# Patient Record
Sex: MALE | Race: WHITE | NOT HISPANIC OR LATINO | Employment: FULL TIME | URBAN - METROPOLITAN AREA
[De-identification: names, ages, dates, MRNs, and addresses within clinical notes are randomized per-mention and may not be internally consistent; named-entity substitution may affect disease eponyms.]

---

## 2023-12-18 ENCOUNTER — LAB (OUTPATIENT)
Dept: LAB | Facility: LAB | Age: 38
End: 2023-12-18
Payer: COMMERCIAL

## 2023-12-18 ENCOUNTER — OFFICE VISIT (OUTPATIENT)
Dept: PRIMARY CARE | Facility: HOSPITAL | Age: 38
End: 2023-12-18
Payer: COMMERCIAL

## 2023-12-18 VITALS
OXYGEN SATURATION: 95 % | SYSTOLIC BLOOD PRESSURE: 126 MMHG | HEIGHT: 72 IN | BODY MASS INDEX: 33.18 KG/M2 | DIASTOLIC BLOOD PRESSURE: 70 MMHG | TEMPERATURE: 98.8 F | WEIGHT: 245 LBS

## 2023-12-18 DIAGNOSIS — Z00.00 ROUTINE HEALTH MAINTENANCE: ICD-10-CM

## 2023-12-18 DIAGNOSIS — E78.5 HYPERLIPIDEMIA, UNSPECIFIED HYPERLIPIDEMIA TYPE: ICD-10-CM

## 2023-12-18 DIAGNOSIS — Z01.00 ENCOUNTER FOR VISION SCREENING: ICD-10-CM

## 2023-12-18 DIAGNOSIS — R03.0 ELEVATED BLOOD PRESSURE READING: ICD-10-CM

## 2023-12-18 DIAGNOSIS — Z00.00 ROUTINE ADULT HEALTH MAINTENANCE: ICD-10-CM

## 2023-12-18 DIAGNOSIS — E78.00 ELEVATED LDL CHOLESTEROL LEVEL: Primary | ICD-10-CM

## 2023-12-18 DIAGNOSIS — Z00.00 ROUTINE ADULT HEALTH MAINTENANCE: Primary | ICD-10-CM

## 2023-12-18 PROBLEM — F17.200 SMOKING: Status: ACTIVE | Noted: 2023-12-18

## 2023-12-18 LAB
ALBUMIN SERPL BCP-MCNC: 4.8 G/DL (ref 3.4–5)
ALP SERPL-CCNC: 73 U/L (ref 33–120)
ALT SERPL W P-5'-P-CCNC: 19 U/L (ref 10–52)
ANION GAP SERPL CALC-SCNC: 14 MMOL/L (ref 10–20)
AST SERPL W P-5'-P-CCNC: 16 U/L (ref 9–39)
BILIRUB SERPL-MCNC: 0.7 MG/DL (ref 0–1.2)
BUN SERPL-MCNC: 16 MG/DL (ref 6–23)
CALCIUM SERPL-MCNC: 9.7 MG/DL (ref 8.6–10.3)
CHLORIDE SERPL-SCNC: 100 MMOL/L (ref 98–107)
CHOLEST SERPL-MCNC: 201 MG/DL (ref 0–199)
CHOLESTEROL/HDL RATIO: 4.3
CO2 SERPL-SCNC: 27 MMOL/L (ref 21–32)
CREAT SERPL-MCNC: 0.83 MG/DL (ref 0.5–1.3)
GFR SERPL CREATININE-BSD FRML MDRD: >90 ML/MIN/1.73M*2
GLUCOSE SERPL-MCNC: 86 MG/DL (ref 74–99)
HDLC SERPL-MCNC: 46.5 MG/DL
LDLC SERPL CALC-MCNC: 130 MG/DL
NON HDL CHOLESTEROL: 155 MG/DL (ref 0–149)
POTASSIUM SERPL-SCNC: 4.2 MMOL/L (ref 3.5–5.3)
PROT SERPL-MCNC: 7.3 G/DL (ref 6.4–8.2)
SODIUM SERPL-SCNC: 137 MMOL/L (ref 136–145)
TRIGL SERPL-MCNC: 123 MG/DL (ref 0–149)
VLDL: 25 MG/DL (ref 0–40)

## 2023-12-18 PROCEDURE — 80053 COMPREHEN METABOLIC PANEL: CPT

## 2023-12-18 PROCEDURE — 36415 COLL VENOUS BLD VENIPUNCTURE: CPT

## 2023-12-18 PROCEDURE — 99396 PREV VISIT EST AGE 40-64: CPT | Performed by: INTERNAL MEDICINE

## 2023-12-18 PROCEDURE — 80061 LIPID PANEL: CPT

## 2023-12-18 PROCEDURE — 99395 PREV VISIT EST AGE 18-39: CPT | Performed by: INTERNAL MEDICINE

## 2023-12-18 PROCEDURE — 90471 IMMUNIZATION ADMIN: CPT | Performed by: INTERNAL MEDICINE

## 2023-12-18 ASSESSMENT — ENCOUNTER SYMPTOMS
OCCASIONAL FEELINGS OF UNSTEADINESS: 0
DEPRESSION: 0
LOSS OF SENSATION IN FEET: 0

## 2023-12-18 ASSESSMENT — PATIENT HEALTH QUESTIONNAIRE - PHQ9
1. LITTLE INTEREST OR PLEASURE IN DOING THINGS: NOT AT ALL
SUM OF ALL RESPONSES TO PHQ9 QUESTIONS 1 AND 2: 0
2. FEELING DOWN, DEPRESSED OR HOPELESS: NOT AT ALL

## 2023-12-18 NOTE — ASSESSMENT & PLAN NOTE
Fasting lipid profile due now.  Fasting blood sugar will be included.  We discussed strategies for lowering cholesterol including exercising for 30 minutes 5 days/week and eating a plant-based whole food diet.  We discussed objectives for diet including increasing intake of fruits and vegetables, increasing fiber, decreasing red meat, processed meat, saturated fat and glucose.  Santi was directed to online resources to help with meal planning.

## 2023-12-18 NOTE — ASSESSMENT & PLAN NOTE
We discussed the health risks of smoking, the health benefits of smoking cessation and different strategies to help quit.  Santi is ready to quit which is important for step.  We discussed prescription medications like Chantix and bupropion and also over-the-counter nicotine replacement.  He is going to read about these different options and make a decision on treatment.

## 2023-12-18 NOTE — PATIENT INSTRUCTIONS
Chantix, Nicotine replacement, Yasmine Acevedo - Craving to Quit - hiren    Flu shot today    Fasting blood work     Schedule eye exam     5 servings of fruits and veg per day, 35 grams of fiber   Avoid - red meat, processed meat, saturated fat, sugar     Movies:  The Game Changers  What the Health  [place] [employer]    Web Sites/Recipes:  Blue Zones  [place] Over [employer]  Plant waygum   Nutritionfacts.org     Authors:  Dr. Rip Horan     Cookbooks:  The Get Healthy, Go Vegan Cookbook: 125 Easy and Delicious Recipes to Jump-Start Weight Loss and Help you Feel Great - Dr. Rip Valadez's Cookbook for Reversing Diabetes:  150 Recipes Scientifically Proven to Reverse Diabetes Without Drugs - Dr. Rip Valadez  The Prevent and Reverse Heart Disease Cookbook - Dr. Adarsh Harper   The [place] Study All-Star Collection:  Whole Food, Plant-Based Recipes from Your Favorite Vegan  - Dr. LORE Alexandra  How Not To Die - Dr. Rob Horan  Blue Zones

## 2023-12-18 NOTE — ASSESSMENT & PLAN NOTE
BP within an acceptable range.  Fasting blood sugar and fasting lipid profile ordered.  Advised on testicular self-exam.  Prostate cancer screening will begin in the mid 40s.  Colon cancer screening will begin at 45.  Eye exam encouraged and referral for ophthalmology placed.  Flu vaccine given.  Tdap up-to-date.  Advised on smoking cessation.

## 2023-12-18 NOTE — PROGRESS NOTES
Chief Complaint   Patient presents with    [facility]         History Of Present Illness:    Santi Arthur is a 38 y.o. male presenting to [facility] care, update health maintenance and discuss strategies for smoking cessation.  Santi has not seen a primary care doctor for the past few years.  He is aware that he has to quit smoking and he is concerned about past high cholesterol levels.  He is aware that his diet can be better.  He has a physically active job but does not exercise outside of work.  His energy level is okay and he denies chest pain or shortness of breath with exertion.  He snores at night but has never had observed apneic episodes.  He denies any change in bowel or bladder habits.  He is currently smoking a pack of cigarettes per day.  He is smoked for over 20 years.  He states that he likes to smoke.  And is relaxing.  He understands that it is a health risk and still wants to quit.      Active Medical Problems:  Patient Active Problem List    Diagnosis Date Noted    Hyperlipidemia 12/18/2023    Smoking 12/18/2023    Routine health maintenance 12/18/2023    Elevated blood pressure reading 12/18/2023       Past Medical History:  Past Medical History:   Diagnosis Date    Hyperlipidemia     Smoking        Past Surgical History:  Past Surgical History:   Procedure Laterality Date    NO PAST SURGERIES           Social History:  Social History     Tobacco Use    Smoking status: Every Day     Packs/day: 1.00     Years: 20.00     Additional pack years: 0.00     Total pack years: 20.00     Types: Cigarettes    Smokeless tobacco: Never   Substance Use Topics    Alcohol use: Yes     Alcohol/week: 7.0 - 10.0 standard drinks of alcohol     Types: 7 - 10 Cans of beer per week     Comment: social 7-8 per week, mostly beer occ bourbon    Drug use: Never         Family History:  Family History   Problem Relation Name Age of Onset    Hypertension Mother      Pancreatic cancer Father      No Known Problems Brother       Eczema Daughter      No Known Problems Daughter          Allergies:  Patient has no known allergies.    Outpatient Medications:  No current outpatient medications on file.    Review of Systems:  Pertinent positives in review of systems outlined above.  Complete ROS otherwise negative.        Last Recorded Vitals:  Vitals:    12/18/23 1107   BP: 126/70   Temp: 37.1 °C (98.8 °F)   SpO2: 95%   Weight: 111 kg (245 lb)   Height: 1.829 m (6')   Body mass index is 33.23 kg/m².        Physical Exam  Vitals reviewed.   Constitutional:       Appearance: Normal appearance.   HENT:      Mouth/Throat:      Mouth: Mucous membranes are moist.      Pharynx: Oropharynx is clear.   Eyes:      Extraocular Movements: Extraocular movements intact.      Conjunctiva/sclera: Conjunctivae normal.      Pupils: Pupils are equal, round, and reactive to light.   Neck:      Vascular: No carotid bruit.   Cardiovascular:      Rate and Rhythm: Normal rate and regular rhythm.   Pulmonary:      Effort: Pulmonary effort is normal.      Breath sounds: Normal breath sounds.   Musculoskeletal:      Cervical back: No tenderness.      Right lower leg: No edema.      Left lower leg: No edema.   Lymphadenopathy:      Cervical: No cervical adenopathy.   Neurological:      General: No focal deficit present.      Mental Status: He is alert and oriented to person, place, and time.   Psychiatric:         Mood and Affect: Mood normal.          Assessment/Plan   Problem List Items Addressed This Visit       Hyperlipidemia     Fasting lipid profile due now.  Fasting blood sugar will be included.  We discussed strategies for lowering cholesterol including exercising for 30 minutes 5 days/week and eating a plant-based whole food diet.  We discussed objectives for diet including increasing intake of fruits and vegetables, increasing fiber, decreasing red meat, processed meat, saturated fat and glucose.  Santi was directed to online resources to help with meal  planning.         Routine health maintenance - Primary     BP within an acceptable range.  Fasting blood sugar and fasting lipid profile ordered.  Advised on testicular self-exam.  Prostate cancer screening will begin in the mid 40s.  Colon cancer screening will begin at 45.  Eye exam encouraged and referral for ophthalmology placed.  Flu vaccine given.  Tdap up-to-date.  Advised on smoking cessation.         Elevated blood pressure reading     Initial blood pressure was elevated, but follow-up blood pressure in the office was normal.         Relevant Orders    Comprehensive Metabolic Panel    Lipid Panel             Nicholas Arredondo MD

## 2023-12-20 ENCOUNTER — TELEPHONE (OUTPATIENT)
Dept: PRIMARY CARE | Facility: CLINIC | Age: 38
End: 2023-12-20
Payer: COMMERCIAL

## 2023-12-20 NOTE — TELEPHONE ENCOUNTER
Left voicemail for patient to give madiha call for results.   Patient called  back for results. He said he will be ready to stop smoking after first year

## 2024-06-27 ENCOUNTER — OFFICE VISIT (OUTPATIENT)
Dept: ORTHOPEDIC SURGERY | Facility: HOSPITAL | Age: 39
End: 2024-06-27
Payer: COMMERCIAL

## 2024-06-27 ENCOUNTER — HOSPITAL ENCOUNTER (OUTPATIENT)
Dept: RADIOLOGY | Facility: HOSPITAL | Age: 39
Discharge: HOME | End: 2024-06-27
Payer: COMMERCIAL

## 2024-06-27 DIAGNOSIS — M25.512 LEFT SHOULDER PAIN, UNSPECIFIED CHRONICITY: ICD-10-CM

## 2024-06-27 DIAGNOSIS — M75.82 ROTATOR CUFF TENDINITIS, LEFT: ICD-10-CM

## 2024-06-27 DIAGNOSIS — S40.012A CONTUSION OF LEFT SHOULDER, INITIAL ENCOUNTER: Primary | ICD-10-CM

## 2024-06-27 PROCEDURE — 73030 X-RAY EXAM OF SHOULDER: CPT | Mod: LEFT SIDE | Performed by: RADIOLOGY

## 2024-06-27 PROCEDURE — 99203 OFFICE O/P NEW LOW 30 MIN: CPT

## 2024-06-27 PROCEDURE — 99213 OFFICE O/P EST LOW 20 MIN: CPT

## 2024-06-27 PROCEDURE — 73030 X-RAY EXAM OF SHOULDER: CPT | Mod: LT

## 2024-06-27 RX ORDER — MELOXICAM 15 MG/1
15 TABLET ORAL DAILY
Qty: 30 TABLET | Refills: 0 | Status: SHIPPED | OUTPATIENT
Start: 2024-06-27 | End: 2024-07-27

## 2024-06-27 ASSESSMENT — PAIN - FUNCTIONAL ASSESSMENT: PAIN_FUNCTIONAL_ASSESSMENT: 0-10

## 2024-06-27 ASSESSMENT — PAIN SCALES - GENERAL: PAINLEVEL_OUTOF10: 4

## 2024-06-27 NOTE — PROGRESS NOTES
[facility] CARE PHYSICIAN: Ronaldo Keys MD  REFERRING PROVIDER: No referring provider defined for this encounter.     CONSULT ORTHOPAEDIC: Shoulder Evaluation    ASSESSMENT & PLAN    Impression: 39 y.o. male with a left shoulder contusion and rotator cuff tendinitis    Plan:   I explained to the patient the nature of their diagnosis.  I reviewed their imaging studies with them.    Based on the history, physical exam and imaging studies above, the patient's presentation is consistent with the above diagnosis.  I had a long discussion with the patient regarding their presentation and the treatment options.  Overall patient's physical exam is normal without any evidence of instability or weakness.  His pain is difficult to reproduce on exam.  Patient's pain has improved however is reaggravated by his persistent heavy lifting at work.  We discussed remaining out of work versus returning to work with lifting restrictions to allow time to rest/heal however patient declined.  Advised patient to finish the Medrol Dosepak prescribed to him by  and take the methocarbamol as needed.  I also provided him with a prescription for meloxicam.  He will continue to ice, rest and avoid aggravating positions of the left shoulder.  Should his pain not improve with the above plan he will follow-up with Dr. Steward for further evaluation and discussion of treatment options.    At the end of the visit, all questions were answered in full. The patient is in agreement with the plan and recommendations. They will call the office with any questions/concerns.    Note dictated with Corhythm software. Completed without full typed error editing and sent to avoid delay.       SUBJECTIVE  CHIEF COMPLAINT:   Chief Complaint   Patient presents with    Left Shoulder - Pain        HPI: Santi Arthur is a pleasant 39 y.o. male who presents to the orthopedic walk-in clinic with left shoulder pain.  Pain began approximately  1 week ago when he was at a water park and slid down a water slide jamming his elbow into the bottom of the pool.  He presented to the urgent care and was given methocarbamol and a Medrol Dosepak.  He feels as though this did not significantly improve his symptoms.  However patient does report that today is the best he has felt since his injury.  He notes that he has not rested the shoulder as he works at [employer] which requires heavy lifting.  He is unable to take time off of work as this is his busy season and is unable to work with lifting restrictions.  Denies history of left shoulder pain or injuries.  He localizes the pain to the trapezius/top of shoulder.  He has tried Tylenol and IcyHot as well as the medications prescribed to him by urgent care.  He is here today for further evaluation.    They deny any associated neck pain.  No numbness, tingling, or paresthesias.    REVIEW OF SYSTEMS  Constitutional: See HPI for pain assessment, No significant recent weight gain or loss, no fever or chills  Cardiovascular: No chest pain, shortness of breath  Respiratory: No difficulty breathing, no cough  Gastrointestinal: No nausea, vomiting, diarrhea, constipation  Musculoskeletal: Noted in HPI, positive for pain, restricted motion, stiffness  Integumentary: No rashes, easy bruising or skin lesions  Neurological: No headache, no numbness or tingling in extremities  Psychiatric: No mood changes or memory changes. No social issues  Heme/Lymph: No excessive swelling, easy bruising or excessive bleeding  ENT: No hearing changes, no nosebleeds  Eyes: No vision changes    Past Medical History:   Diagnosis Date    Hyperlipidemia     Smoking         No Known Allergies     Past Surgical History:   Procedure Laterality Date    NO PAST SURGERIES          Family History   Problem Relation Name Age of Onset    Hypertension Mother      Pancreatic cancer Father      No Known Problems Brother      Eczema Daughter      No Known Problems  Daughter          Social History     Socioeconomic History    Marital status:      Spouse name: Not on file    Number of children: Not on file    Years of education: Not on file    Highest education level: Not on file   Occupational History    Occupation: [employer]   Tobacco Use    Smoking status: Every Day     Current packs/day: 1.00     Average packs/day: 1 pack/day for 20.0 years (20.0 ttl pk-yrs)     Types: Cigarettes    Smokeless tobacco: Never   Substance and Sexual Activity    Alcohol use: Yes     Alcohol/week: 7.0 - 10.0 standard drinks of alcohol     Types: 7 - 10 Cans of beer per week     Comment: social 7-8 per week, mostly beer occ bourbon    Drug use: Never    Sexual activity: Yes     Comment:  9yr, [employer]   Other Topics Concern    Not on file   Social History Narrative    Active at work, no exercise outside of work      Social Determinants of Health     Financial Resource Strain: Not on file   Food Insecurity: Not on file   Transportation Needs: Not on file   Physical Activity: Not on file   Stress: Not on file   Social Connections: Not on file   Intimate Partner Violence: Not on file   Housing Stability: Not on file        CURRENT MEDICATIONS:   No current outpatient medications on file.     No current facility-administered medications for this visit.        OBJECTIVE    PHYSICAL EXAM      12/18/2023    11:07 AM   Vitals   Systolic 126   Diastolic 70   Temp 37.1 °C (98.8 °F)   Height (in) 1.829 m (6')   Weight (lb) 245   BMI 33.23 kg/m2   BSA (m2) 2.37 m2   Visit Report Report      There is no height or weight on file to calculate BMI.    GENERAL: A/Ox3, NAD. Appears healthy, well nourished  PSYCHIATRIC: Mood stable, appropriate memory recall  EYES: EOM intact, no scleral icterus  CARDIOVASCULAR: Palpable peripheral pulses  LUNGS: Breathing non-labored on room air  SKIN: No open lesions, rashes, ulcerations    MUSCULOSKELETAL:  Laterality: Left shoulder Exam  - Negative Spurlings, full  painless neck ROM  - Skin intact  - No erythema or warmth  - No ecchymosis or soft tissue swelling  - Shoulder ROM: Forward flexion 150, ER 50, IR lower thoracic without difficulty  - Strength:      Jobes 5/5     ER 5/5     Belly press and bearhug 5/5  - Palpation: Mild TTP over trapezius  - Andressa and Ernst negative  - Speeds and O'Briens negative    NEUROVASCULAR:  - Neurovascular Status: sensation intact to light touch distally, upper extremity motor grossly intact  - Capillary refill brisk at extremities, Bilateral peripheral pulses 2+    Imaging: Multiple views of the affected left shoulder(s) demonstrate: No acute osseous abnormality, no fracture, no significant degenerative changes.   X-rays were personally reviewed and interpreted by me.  Radiology reports were reviewed by me as well, if readily available at the time.

## 2024-07-01 ENCOUNTER — OFFICE VISIT (OUTPATIENT)
Dept: PRIMARY CARE | Facility: HOSPITAL | Age: 39
End: 2024-07-01
Payer: COMMERCIAL

## 2024-07-01 VITALS
HEART RATE: 87 BPM | TEMPERATURE: 98.4 F | DIASTOLIC BLOOD PRESSURE: 88 MMHG | SYSTOLIC BLOOD PRESSURE: 158 MMHG | OXYGEN SATURATION: 99 % | WEIGHT: 237.5 LBS | BODY MASS INDEX: 32.21 KG/M2

## 2024-07-01 DIAGNOSIS — M25.512 ACUTE PAIN OF LEFT SHOULDER: Primary | ICD-10-CM

## 2024-07-01 PROCEDURE — 99213 OFFICE O/P EST LOW 20 MIN: CPT | Performed by: INTERNAL MEDICINE

## 2024-07-01 RX ORDER — IBUPROFEN 600 MG/1
600 TABLET ORAL 2 TIMES DAILY
Qty: 30 TABLET | Refills: 0 | Status: SHIPPED | OUTPATIENT
Start: 2024-07-01

## 2024-07-01 NOTE — PROGRESS NOTES
Chief Complaint:   Shoulder Pain (Left shoulder pain)     History Of Present Illness:    Santi Arthur is a 39 y.o. male with active medical problems outlined below who presents for evaluation and management of left shoulder pain.      Hit elbow at water park came down slide and jammed elbow and shoulder -next day had pain   Seen at urgent care - one week ago   Seen at walk-in ortho clinic - Xray unremarkable for fracture or dislocation.   Pain is slowly improving.  Worse with certain movements like propping arm up on window while driving.   Pain is non-radiating.  Normal strength in hand.  No numbness or tingling.          Patient Active Problem List   Diagnosis    Hyperlipidemia    Smoking    Routine health maintenance    Elevated blood pressure reading    Acute pain of left shoulder       Current Outpatient Medications:     ibuprofen 600 mg tablet, Take 1 tablet (600 mg) by mouth 2 times a day., Disp: 30 tablet, Rfl: 0    meloxicam (Mobic) 15 mg tablet, Take 1 tablet (15 mg) by mouth once daily. (Patient not taking: Reported on 7/1/2024), Disp: 30 tablet, Rfl: 0  Patient has no known allergies.    Social History     Tobacco Use    Smoking status: Every Day     Current packs/day: 1.00     Average packs/day: 1 pack/day for 20.0 years (20.0 ttl pk-yrs)     Types: Cigarettes    Smokeless tobacco: Never   Substance Use Topics    Alcohol use: Yes     Alcohol/week: 7.0 - 10.0 standard drinks of alcohol     Types: 7 - 10 Cans of beer per week     Comment: social 7-8 per week, mostly beer occ bourbon    Drug use: Never         All pertinent aspects of medical and surgical history were reviewed and updated in chart    Review of Systems   Pertinent positives in review of systems outlined above.  Complete ROS otherwise negative.        Last Recorded Vitals:  No data found.         Physical Exam  Musculoskeletal:      Comments: Normal range of motion in left shoulder.  Rotator cuff appears intact.  No evidence of impingement.   No pain at the AC joint or subacromial bursa.  Some tenderness to palpation over the medial scapula.             The 10-year ASCVD risk score (Yohana GANDHI, et al., 2019) is: 6.4%    Values used to calculate the score:      Age: 39 years      Sex: Male      Is Non- : No      Diabetic: No      Tobacco smoker: Yes      Systolic Blood Pressure: 158 mmHg      Is BP treated: No      HDL Cholesterol: 46.5 mg/dL      Total Cholesterol: 201 mg/dL      Assessment/Plan   Problem List Items Addressed This Visit       Acute pain of left shoulder - Primary     Scapulothoracic pain related to muscle strain.  X-ray of shoulder within normal limits, and rotator cuff appears intact by exam.  No relief with meloxicam.  Santi will take ibuprofen 600 mg twice daily, he will apply heat for 20 minutes 3 times daily.  He will call if shoulder pain does not continue to improve.         Relevant Medications    ibuprofen 600 mg tablet       A total of 20 minutes was spent reviewing the chart and recent testing and discussing plan of care.         Nicholas Arredondo MD

## 2024-07-02 PROBLEM — M25.512 ACUTE PAIN OF LEFT SHOULDER: Status: ACTIVE | Noted: 2024-07-02

## 2024-07-02 NOTE — ASSESSMENT & PLAN NOTE
Scapulothoracic pain related to muscle strain.  X-ray of shoulder within normal limits, and rotator cuff appears intact by exam.  No relief with meloxicam.  Santi will take ibuprofen 600 mg twice daily, he will apply heat for 20 minutes 3 times daily.  He will call if shoulder pain does not continue to improve.

## 2025-02-10 ENCOUNTER — APPOINTMENT (OUTPATIENT)
Dept: PRIMARY CARE | Facility: CLINIC | Age: 40
End: 2025-02-10
Payer: COMMERCIAL

## 2025-02-10 VITALS
HEIGHT: 66 IN | WEIGHT: 248 LBS | BODY MASS INDEX: 39.86 KG/M2 | HEART RATE: 94 BPM | TEMPERATURE: 99 F | OXYGEN SATURATION: 98 % | DIASTOLIC BLOOD PRESSURE: 101 MMHG | SYSTOLIC BLOOD PRESSURE: 151 MMHG

## 2025-02-10 DIAGNOSIS — I10 UNCONTROLLED HYPERTENSION: ICD-10-CM

## 2025-02-10 DIAGNOSIS — G47.9 SLEEP DISORDER: ICD-10-CM

## 2025-02-10 DIAGNOSIS — Z00.00 ROUTINE GENERAL MEDICAL EXAMINATION AT A HEALTH CARE FACILITY: Primary | ICD-10-CM

## 2025-02-10 DIAGNOSIS — E78.5 HYPERLIPIDEMIA, UNSPECIFIED HYPERLIPIDEMIA TYPE: ICD-10-CM

## 2025-02-10 DIAGNOSIS — F17.200 TOBACCO DEPENDENCY: ICD-10-CM

## 2025-02-10 DIAGNOSIS — R73.9 HYPERGLYCEMIA: ICD-10-CM

## 2025-02-10 DIAGNOSIS — Z51.81 ENCOUNTER FOR MEDICATION MONITORING: ICD-10-CM

## 2025-02-10 DIAGNOSIS — Z71.6 ENCOUNTER FOR SMOKING CESSATION COUNSELING: ICD-10-CM

## 2025-02-10 DIAGNOSIS — E78.5 DYSLIPIDEMIA: ICD-10-CM

## 2025-02-10 PROBLEM — R03.0 ELEVATED BLOOD PRESSURE READING: Status: RESOLVED | Noted: 2023-12-18 | Resolved: 2025-02-10

## 2025-02-10 PROBLEM — M25.512 ACUTE PAIN OF LEFT SHOULDER: Status: RESOLVED | Noted: 2024-07-02 | Resolved: 2025-02-10

## 2025-02-10 PROCEDURE — 99406 BEHAV CHNG SMOKING 3-10 MIN: CPT | Performed by: STUDENT IN AN ORGANIZED HEALTH CARE EDUCATION/TRAINING PROGRAM

## 2025-02-10 PROCEDURE — 3077F SYST BP >= 140 MM HG: CPT | Performed by: STUDENT IN AN ORGANIZED HEALTH CARE EDUCATION/TRAINING PROGRAM

## 2025-02-10 PROCEDURE — 99396 PREV VISIT EST AGE 40-64: CPT | Performed by: STUDENT IN AN ORGANIZED HEALTH CARE EDUCATION/TRAINING PROGRAM

## 2025-02-10 PROCEDURE — 3080F DIAST BP >= 90 MM HG: CPT | Performed by: STUDENT IN AN ORGANIZED HEALTH CARE EDUCATION/TRAINING PROGRAM

## 2025-02-10 PROCEDURE — 99214 OFFICE O/P EST MOD 30 MIN: CPT | Performed by: STUDENT IN AN ORGANIZED HEALTH CARE EDUCATION/TRAINING PROGRAM

## 2025-02-10 PROCEDURE — 3008F BODY MASS INDEX DOCD: CPT | Performed by: STUDENT IN AN ORGANIZED HEALTH CARE EDUCATION/TRAINING PROGRAM

## 2025-02-10 RX ORDER — CHLORTHALIDONE 50 MG/1
50 TABLET ORAL DAILY
Qty: 90 TABLET | Refills: 3 | Status: SHIPPED | OUTPATIENT
Start: 2025-02-10

## 2025-02-10 RX ORDER — BUPROPION HYDROCHLORIDE 150 MG/1
150 TABLET, EXTENDED RELEASE ORAL 2 TIMES DAILY
Qty: 180 TABLET | Refills: 3 | Status: SHIPPED | OUTPATIENT
Start: 2025-02-10

## 2025-02-10 ASSESSMENT — ENCOUNTER SYMPTOMS
LIGHT-HEADEDNESS: 0
CHILLS: 0
EYE PAIN: 0
DIZZINESS: 0
SHORTNESS OF BREATH: 0
HEMATURIA: 0
FEVER: 0
RHINORRHEA: 0
UNEXPECTED WEIGHT CHANGE: 0
ABDOMINAL PAIN: 0

## 2025-02-10 ASSESSMENT — COLUMBIA-SUICIDE SEVERITY RATING SCALE - C-SSRS
1. IN THE PAST MONTH, HAVE YOU WISHED YOU WERE DEAD OR WISHED YOU COULD GO TO SLEEP AND NOT WAKE UP?: NO
2. HAVE YOU ACTUALLY HAD ANY THOUGHTS OF KILLING YOURSELF?: NO
6. HAVE YOU EVER DONE ANYTHING, STARTED TO DO ANYTHING, OR PREPARED TO DO ANYTHING TO END YOUR LIFE?: NO

## 2025-02-10 ASSESSMENT — PAIN SCALES - GENERAL: PAINLEVEL_OUTOF10: 2

## 2025-02-10 ASSESSMENT — PATIENT HEALTH QUESTIONNAIRE - PHQ9
SUM OF ALL RESPONSES TO PHQ9 QUESTIONS 1 AND 2: 0
2. FEELING DOWN, DEPRESSED OR HOPELESS: NOT AT ALL
1. LITTLE INTEREST OR PLEASURE IN DOING THINGS: NOT AT ALL

## 2025-02-10 NOTE — PROGRESS NOTES
"Subjective     Patient ID: Santi Arthur is a 40 y.o. male who presents today for a Preventative Visit/Physical and address other issues.        Tobacco/Alcohol/Drug Use:   Social History     Tobacco Use    Smoking status: Every Day     Current packs/day: 1.00     Average packs/day: 1 pack/day for 20.0 years (20.0 ttl pk-yrs)     Types: Cigarettes    Smokeless tobacco: Never   Substance Use Topics    Alcohol use: Yes     Alcohol/week: 7.0 - 10.0 standard drinks of alcohol     Types: 7 - 10 Cans of beer per week     Comment: social 7-8 per week, mostly beer occ bourbon       Required Screenings/Immunizations:   Health Maintenance Due   Topic Date Due    HIV Screening  Never done    MMR Vaccines (1 of 1 - Standard series) Never done    Varicella Vaccines (1 of 2 - 13+ 2-dose series) Never done    Hepatitis C Screening  Never done    Hepatitis B Vaccines (1 of 3 - 19+ 3-dose series) Never done    Pneumococcal Vaccine: Pediatrics and At-Risk Adult Patients (1 of 2 - PCV) Never done    Influenza Vaccine (1) 09/01/2024    COVID-19 Vaccine (1 - 2024-25 season) Never done       Problems to be addressed today in addition to Preventative Services: As stated in orders.     Review of Systems   Constitutional:  Negative for chills, fever and unexpected weight change.   HENT:  Negative for rhinorrhea.    Eyes:  Negative for pain.   Respiratory:  Negative for shortness of breath.    Cardiovascular:  Negative for chest pain.   Gastrointestinal:  Negative for abdominal pain.   Genitourinary:  Negative for hematuria.   Skin:  Negative for rash.   Neurological:  Negative for dizziness, syncope and light-headedness.   Psychiatric/Behavioral:  Negative for behavioral problems and suicidal ideas.        BP (!) 151/101   Pulse 94   Temp 37.2 °C (99 °F)   Ht 1.676 m (5' 6\")   Wt 112 kg (248 lb)   SpO2 98%   BMI 40.03 kg/m²      Objective   Physical Exam  Vitals reviewed.   Constitutional:       General: He is not in acute " "distress.  HENT:      Head: Normocephalic.      Right Ear: External ear normal.      Left Ear: External ear normal.      Nose: No rhinorrhea.      Mouth/Throat:      Mouth: Mucous membranes are moist.   Eyes:      Conjunctiva/sclera: Conjunctivae normal.   Cardiovascular:      Rate and Rhythm: Normal rate and regular rhythm.      Heart sounds: No murmur heard.     No friction rub. No gallop.   Pulmonary:      Effort: No respiratory distress.      Breath sounds: Wheezing (end-expir wheezing right lower lung field) present. No rhonchi or rales.   Abdominal:      General: Bowel sounds are normal. There is no distension.      Palpations: Abdomen is soft.      Tenderness: There is no abdominal tenderness.   Musculoskeletal:      Cervical back: Neck supple.      Right lower leg: No edema.      Left lower leg: No edema.   Skin:     General: Skin is warm and dry.      Capillary Refill: Capillary refill takes less than 2 seconds.   Neurological:      Mental Status: He is alert.      Gait: Gait normal.           Labs: No results found for: \"WBC\", \"HGB\", \"HCT\", \"PLT\", \"TSH\", \"PSA\", \"INR\", \"GLUF\"  Lab Results   Component Value Date     12/18/2023    K 4.2 12/18/2023     12/18/2023    BUN 16 12/18/2023    CREATININE 0.83 12/18/2023    GLUCOSE 86 12/18/2023    CALCIUM 9.7 12/18/2023    PROT 7.3 12/18/2023    BILITOT 0.7 12/18/2023    ALKPHOS 73 12/18/2023    AST 16 12/18/2023    ALT 19 12/18/2023     Lab Results   Component Value Date    CHOL 201 (H) 12/18/2023      Lab Results   Component Value Date    TRIG 123 12/18/2023      Lab Results   Component Value Date    HDL 46.5 12/18/2023     Lab Results   Component Value Date    LDLCALC 130 (H) 12/18/2023      Lab Results   Component Value Date    VLDL 25 12/18/2023    No components found for: \"CHOLHDLRATI0\"    Imaging/Testing: XR shoulder left 2+ views  Narrative: Interpreted By:  Sd Calabrese,   STUDY:  XR SHOULDER LEFT 2+ VIEWS; ;  6/27/2024 1:55 pm    "   INDICATION:  Signs/Symptoms:left shoulder pain.      COMPARISON:  None.      ACCESSION NUMBER(S):  [id number]      ORDERING CLINICIAN:  GABRIELLE LOPRESTI      FINDINGS:  Left shoulder, three views      There is no fracture. There is no dislocation. There are no  degenerative changes. There is no lytic or sclerotic lesion. There is  no soft tissue abnormality seen.      Impression: No abnormality in the left shoulder      MACRO:  None      Signed by: Sd Calabrese 6/28/2024 5:46 PM  Dictation workstation:   [id number]      Problem List Items Addressed This Visit          Medium    Hyperlipidemia    Tobacco dependency    Current Assessment & Plan     We discussed smoking cessation. Patient currently smokes 1-pack-per day. Barriers to quitting: work. We discussion the importance of quitting for their cardiovascular health and lung health, and how long term damage from cigarette and tobacco use can lead to things such as heart disease, stroke, COPD, and cancer. Techniques discussed include nicotine based medication like Wellbutrin. Patient is is interested in quitting. They have decided on a quit date, 3/10/25. I spent 5 minutes counseling patient.         Relevant Medications    buPROPion SR (Wellbutrin SR) 150 mg 12 hr tablet    Uncontrolled hypertension    Current Assessment & Plan     -BP Goal <130/80.  -Started chlorthalidone 50 mg on 2/10/2025.  -Patient and I discussed dietary and lifestyle modifications, including DASH diet.  -Will obtain home sleep study test based on STOP-BANG score of 3.         Relevant Medications    chlorthalidone (Hygroton) 50 mg tablet    Other Relevant Orders    Albumin-Creatinine Ratio, Urine Random    Sleep disorder    Current Assessment & Plan     -Will obtain home sleep study test based on STOP-BANG score of 3.           Relevant Orders    Home sleep apnea test (HSAT)     Other Visit Diagnoses       Routine general medical examination at a health care facility    -  Primary     Relevant Orders    CBC and Auto Differential    Comprehensive Metabolic Panel    Lipid panel    TSH with reflex to Free T4 if abnormal    Encounter for smoking cessation counseling        Relevant Medications    buPROPion SR (Wellbutrin SR) 150 mg 12 hr tablet    Encounter for medication monitoring        Relevant Orders    CBC and Auto Differential    Comprehensive Metabolic Panel    Dyslipidemia        Relevant Orders    Lipid panel    Hyperglycemia        Relevant Orders    Hemoglobin A1c              As part of today's Preventative Visit, an age and gender-appropriate history and physical was performed, as documented below. Counseling and anticipatory guidance were performed, and risk factor reduction interventions (including [place] Preventative Services Task Force recommended screening tests) were utilized/ordered as outlined in the above Assessment and Plan. All patient medications were reviewed, and refilled if necessary.    Patient and I discussed diet/nutrition, lifestyle modifications, safety, medication indications and side effects, and health goals.    current treatment plan is effective, no change in therapy, orders and follow up as documented in EMR, lab results reviewed with patient, repeat labs ordered prior to next appointment, reviewed compliance with lifestyle measures, reviewed diet, exercise and weight control, reviewed medications and side effects in detail     3 months to calculate ASCVD score when patient's blood pressure is lower, hypertension follow-up, home sleep study results.      I have reviewed OARRS report, consistent with prescribed medication. I have considered risks of abuse, diversion, side effects and feel clinically benefit of medication outweighs risks at this time.

## 2025-02-10 NOTE — ASSESSMENT & PLAN NOTE
We discussed smoking cessation. Patient currently smokes 1-pack-per day. Barriers to quitting: work. We discussion the importance of quitting for their cardiovascular health and lung health, and how long term damage from cigarette and tobacco use can lead to things such as heart disease, stroke, COPD, and cancer. Techniques discussed include nicotine based medication like Wellbutrin. Patient is is interested in quitting. They have decided on a quit date, 3/10/25. I spent 5 minutes counseling patient.

## 2025-02-10 NOTE — PATIENT INSTRUCTIONS
Quitting Smoking    Quitting smoking is the most important step you can take to improve your health. We're glad you have set a goal to improve your health.    Quit Smoking Resources    In addition to medications, use the STAR plan to help you successfully quit.   Stick with your quit date!   Tell friends, family, and coworkers your quit date. Request their understanding and support.  Anticipate and prepare for challenges. Some examples are withdrawal symptoms, being around others who smoke, and drinking alcohol.  Remove all tobacco products and paraphernalia from your environment. Make your home and vehicles smoke-free.    Free resources for additional support:  National tobacco quitline: 1-800-QUIT-NOW (1-251.259.4466).  SmokefreeTXT is a free text program to assist you in quitting. Visit https://www.smokefree.gov/smokefreetxt for more information.  Feel free to call your care manager at ([phone]) for additional support.

## 2025-02-10 NOTE — ASSESSMENT & PLAN NOTE
-BP Goal <130/80.  -Started chlorthalidone 50 mg on 2/10/2025.  -Patient and I discussed dietary and lifestyle modifications, including DASH diet.  -Will obtain home sleep study test based on STOP-BANG score of 3.

## 2025-02-11 LAB
ALBUMIN SERPL-MCNC: 4.9 G/DL (ref 3.6–5.1)
ALBUMIN/CREAT UR: 18 MG/G CREAT
ALP SERPL-CCNC: 77 U/L (ref 36–130)
ALT SERPL-CCNC: 27 U/L (ref 9–46)
ANION GAP SERPL CALCULATED.4IONS-SCNC: 10 MMOL/L (CALC) (ref 7–17)
AST SERPL-CCNC: 20 U/L (ref 10–40)
BASOPHILS # BLD AUTO: 55 CELLS/UL (ref 0–200)
BASOPHILS NFR BLD AUTO: 0.5 %
BILIRUB SERPL-MCNC: 0.9 MG/DL (ref 0.2–1.2)
BUN SERPL-MCNC: 14 MG/DL (ref 7–25)
CALCIUM SERPL-MCNC: 9.7 MG/DL (ref 8.6–10.3)
CHLORIDE SERPL-SCNC: 101 MMOL/L (ref 98–110)
CHOLEST SERPL-MCNC: 230 MG/DL
CHOLEST/HDLC SERPL: 4.3 (CALC)
CO2 SERPL-SCNC: 28 MMOL/L (ref 20–32)
CREAT SERPL-MCNC: 0.97 MG/DL (ref 0.6–1.29)
CREAT UR-MCNC: 55 MG/DL (ref 20–320)
EGFRCR SERPLBLD CKD-EPI 2021: 100 ML/MIN/1.73M2
EOSINOPHIL # BLD AUTO: 154 CELLS/UL (ref 15–500)
EOSINOPHIL NFR BLD AUTO: 1.4 %
ERYTHROCYTE [DISTWIDTH] IN BLOOD BY AUTOMATED COUNT: 12.7 % (ref 11–15)
EST. AVERAGE GLUCOSE BLD GHB EST-MCNC: 108 MG/DL
EST. AVERAGE GLUCOSE BLD GHB EST-SCNC: 6 MMOL/L
GLUCOSE SERPL-MCNC: 93 MG/DL (ref 65–99)
HBA1C MFR BLD: 5.4 % OF TOTAL HGB
HCT VFR BLD AUTO: 53.6 % (ref 38.5–50)
HDLC SERPL-MCNC: 53 MG/DL
HGB BLD-MCNC: 17.8 G/DL (ref 13.2–17.1)
LDLC SERPL CALC-MCNC: 147 MG/DL (CALC)
LYMPHOCYTES # BLD AUTO: 2453 CELLS/UL (ref 850–3900)
LYMPHOCYTES NFR BLD AUTO: 22.3 %
MCH RBC QN AUTO: 30.5 PG (ref 27–33)
MCHC RBC AUTO-ENTMCNC: 33.2 G/DL (ref 32–36)
MCV RBC AUTO: 91.9 FL (ref 80–100)
MICROALBUMIN UR-MCNC: 1 MG/DL
MONOCYTES # BLD AUTO: 990 CELLS/UL (ref 200–950)
MONOCYTES NFR BLD AUTO: 9 %
NEUTROPHILS # BLD AUTO: 7348 CELLS/UL (ref 1500–7800)
NEUTROPHILS NFR BLD AUTO: 66.8 %
NONHDLC SERPL-MCNC: 177 MG/DL (CALC)
PLATELET # BLD AUTO: 283 THOUSAND/UL (ref 140–400)
PMV BLD REES-ECKER: 10.2 FL (ref 7.5–12.5)
POTASSIUM SERPL-SCNC: 4.4 MMOL/L (ref 3.5–5.3)
PROT SERPL-MCNC: 7.5 G/DL (ref 6.1–8.1)
RBC # BLD AUTO: 5.83 MILLION/UL (ref 4.2–5.8)
SODIUM SERPL-SCNC: 139 MMOL/L (ref 135–146)
TRIGL SERPL-MCNC: 162 MG/DL
TSH SERPL-ACNC: 0.89 MIU/L (ref 0.4–4.5)
WBC # BLD AUTO: 11 THOUSAND/UL (ref 3.8–10.8)

## 2025-02-17 ENCOUNTER — TELEPHONE (OUTPATIENT)
Dept: SLEEP MEDICINE | Facility: HOSPITAL | Age: 40
End: 2025-02-17
Payer: COMMERCIAL

## 2025-02-17 NOTE — TELEPHONE ENCOUNTER
Patient was recently placed an order to have a home sleep study unit mailed to his home.     Patients order was sent to [facility]. They did let me know that patients pre-cert was denied and that it will need a peer to peer within 14 days.     Auth #- [id number]    Cohere #- [phone]    Please be advised.     Addie

## 2025-05-11 ASSESSMENT — ENCOUNTER SYMPTOMS
ABDOMINAL PAIN: 0
FEVER: 0
CHILLS: 0
DIZZINESS: 0
EYE PAIN: 0
RHINORRHEA: 0
UNEXPECTED WEIGHT CHANGE: 0
SHORTNESS OF BREATH: 0
LIGHT-HEADEDNESS: 0
HEMATURIA: 0

## 2025-05-11 NOTE — ASSESSMENT & PLAN NOTE
"- Patient opted against statin at this time.  He would like to continue dietary modifications.  Will repeat lipid panel and follow-up.    Lab Results   Component Value Date    CHOL 230 (H) 02/10/2025    CHOL 201 (H) 12/18/2023     Lab Results   Component Value Date    HDL 53 02/10/2025    HDL 46.5 12/18/2023     Lab Results   Component Value Date    LDLCALC 147 (H) 02/10/2025    LDLCALC 130 (H) 12/18/2023     Lab Results   Component Value Date    TRIG 162 (H) 02/10/2025    TRIG 123 12/18/2023     No components found for: \"CHOLHDL\"    The 10-year ASCVD risk score (Yohana GANDHI, et al., 2019) is: 6.9%    Values used to calculate the score:      Age: 40 years      Sex: Male      Is Non- : No      Diabetic: No      Tobacco smoker: Yes      Systolic Blood Pressure: 151 mmHg      Is BP treated: No      HDL Cholesterol: 53 mg/dL      Total Cholesterol: 230 mg/dL    "

## 2025-05-11 NOTE — PROGRESS NOTES
Subjective     Patient ID: Santi Arthur is a 40 y.o. male who presents today for follow-up.    Patient denies chest pain/pressure/tightness, shortness of breath, orthopnea, paroxysmal nocturnal dyspnea, lower limb edema, blurred vision, lightheadedness/dizziness, presyncope, syncope.        Tobacco/Alcohol/Drug Use:   Social History     Tobacco Use    Smoking status: Every Day     Current packs/day: 1.00     Average packs/day: 1 pack/day for 20.0 years (20.0 ttl pk-yrs)     Types: Cigarettes     Passive exposure: Current    Smokeless tobacco: Never   Substance Use Topics    Alcohol use: Yes     Alcohol/week: 7.0 - 10.0 standard drinks of alcohol     Types: 7 - 10 Cans of beer per week     Comment: social 7-8 per week, mostly beer occ bourbon       Required Screenings/Immunizations:   Health Maintenance Due   Topic Date Due    HIV Screening  Never done    MMR Vaccines (1 of 1 - Standard series) Never done    Varicella Vaccines (1 of 2 - 13+ 2-dose series) Never done    Hepatitis C Screening  Never done    Hepatitis B Vaccines (1 of 3 - 19+ 3-dose series) Never done    Pneumococcal Vaccine: Pediatrics and At-Risk Adult Patients (1 of 2 - PCV) Never done    COVID-19 Vaccine (1 - 2024-25 season) Never done       Problems to be addressed today in addition to Preventative Services: As stated in orders.     Review of Systems   Constitutional:  Negative for chills, fever and unexpected weight change.   HENT:  Negative for rhinorrhea.    Eyes:  Negative for pain.   Respiratory:  Negative for shortness of breath.    Cardiovascular:  Negative for chest pain.   Gastrointestinal:  Negative for abdominal pain.   Genitourinary:  Negative for hematuria.   Skin:  Negative for rash.   Neurological:  Negative for dizziness, syncope and light-headedness.   Psychiatric/Behavioral:  Negative for behavioral problems and suicidal ideas.        BP (!) 141/93 (BP Location: Right arm, Patient Position: Sitting, BP Cuff Size: Adult)   Pulse 89  "  Temp 36.9 °C (98.4 °F) (Oral)   Ht 1.854 m (6' 1\")   Wt 107 kg (235 lb 12.8 oz)   SpO2 96%   BMI 31.11 kg/m²      Objective   Physical Exam  Vitals reviewed.   Constitutional:       General: He is not in acute distress.  HENT:      Head: Normocephalic.      Right Ear: External ear normal.      Left Ear: External ear normal.      Nose: No rhinorrhea.      Mouth/Throat:      Mouth: Mucous membranes are moist.   Eyes:      Conjunctiva/sclera: Conjunctivae normal.   Cardiovascular:      Rate and Rhythm: Normal rate and regular rhythm.      Heart sounds: No murmur heard.     No friction rub. No gallop.   Pulmonary:      Effort: No respiratory distress.      Breath sounds: No wheezing (end-expir wheezing right lower lung field), rhonchi or rales.   Abdominal:      General: Bowel sounds are normal. There is no distension.      Palpations: Abdomen is soft.      Tenderness: There is no abdominal tenderness.   Musculoskeletal:      Cervical back: Neck supple.      Right lower leg: No edema.      Left lower leg: No edema.   Skin:     General: Skin is warm and dry.      Capillary Refill: Capillary refill takes less than 2 seconds.   Neurological:      Mental Status: He is alert.      Gait: Gait normal.           Labs:   Lab Results   Component Value Date    WBC 11.0 (H) 02/10/2025    HGB 17.8 (H) 02/10/2025    HCT 53.6 (H) 02/10/2025     02/10/2025    TSH 0.89 02/10/2025     Lab Results   Component Value Date     02/10/2025    K 4.4 02/10/2025     02/10/2025    BUN 14 02/10/2025    CREATININE 0.97 02/10/2025    GLUCOSE 93 02/10/2025    CALCIUM 9.7 02/10/2025    PROT 7.5 02/10/2025    BILITOT 0.9 02/10/2025    ALKPHOS 77 02/10/2025    AST 20 02/10/2025    ALT 27 02/10/2025     Lab Results   Component Value Date    CHOL 230 (H) 02/10/2025    CHOL 201 (H) 12/18/2023      Lab Results   Component Value Date    TRIG 162 (H) 02/10/2025    TRIG 123 12/18/2023      Lab Results   Component Value Date    HDL 53 " "02/10/2025    HDL 46.5 12/18/2023     Lab Results   Component Value Date    LDLCALC 147 (H) 02/10/2025    LDLCALC 130 (H) 12/18/2023      Lab Results   Component Value Date    VLDL 25 12/18/2023    No components found for: \"CHOLHDLRATI0\"    Imaging/Testing: XR shoulder left 2+ views  Narrative: Interpreted By:  Sd Calabrese,   STUDY:  XR SHOULDER LEFT 2+ VIEWS; ;  6/27/2024 1:55 pm      INDICATION:  Signs/Symptoms:left shoulder pain.      COMPARISON:  None.      ACCESSION NUMBER(S):  [id number]      ORDERING CLINICIAN:  GABRIELLE LOPRESTI      FINDINGS:  Left shoulder, three views      There is no fracture. There is no dislocation. There are no  degenerative changes. There is no lytic or sclerotic lesion. There is  no soft tissue abnormality seen.      Impression: No abnormality in the left shoulder      MACRO:  None      Signed by: Sd Calabrese 6/28/2024 5:46 PM  Dictation workstation:   [id number]      Problem List Items Addressed This Visit          Medium    Hyperlipidemia    Current Assessment & Plan   - Patient opted against statin at this time.  He would like to continue dietary modifications.  Will repeat lipid panel and follow-up.    Lab Results   Component Value Date    CHOL 230 (H) 02/10/2025    CHOL 201 (H) 12/18/2023     Lab Results   Component Value Date    HDL 53 02/10/2025    HDL 46.5 12/18/2023     Lab Results   Component Value Date    LDLCALC 147 (H) 02/10/2025    LDLCALC 130 (H) 12/18/2023     Lab Results   Component Value Date    TRIG 162 (H) 02/10/2025    TRIG 123 12/18/2023     No components found for: \"CHOLHDL\"    The 10-year ASCVD risk score (Yohana DK, et al., 2019) is: 6.9%    Values used to calculate the score:      Age: 40 years      Sex: Male      Is Non- : No      Diabetic: No      Tobacco smoker: Yes      Systolic Blood Pressure: 151 mmHg      Is BP treated: No      HDL Cholesterol: 53 mg/dL      Total Cholesterol: 230 mg/dL           Relevant Orders "    Lipid panel    Tobacco dependency    Current Assessment & Plan   We discussed smoking cessation. Patient currently smokes 1-pack-per day. Barriers to quitting: work. We discussion the importance of quitting for their cardiovascular health and lung health, and how long term damage from cigarette and tobacco use can lead to things such as heart disease, stroke, COPD, and cancer. Techniques discussed include nicotine based medication like Wellbutrin. Patient is is interested in quitting. They have decided on a quit date, 3/10/25. I spent 5 minutes counseling patient.         Uncontrolled hypertension - Primary    Current Assessment & Plan   -BP Goal <130/80.  -Started chlorthalidone 50 mg on 2/10/2025.  -Started Lisinopril 5mg on 5/12/2025.   -Patient and I discussed dietary and lifestyle modifications, including DASH diet.  -Will obtain home sleep study test based on STOP-BANG score of 3.         Relevant Medications    lisinopril 5 mg tablet         As part of today's Preventative Visit, an age and gender-appropriate history and physical was performed, as documented below. Counseling and anticipatory guidance were performed, and risk factor reduction interventions (including [place] Preventative Services Task Force recommended screening tests) were utilized/ordered as outlined in the above Assessment and Plan. All patient medications were reviewed, and refilled if necessary.    Patient and I discussed diet/nutrition, lifestyle modifications, safety, medication indications and side effects, and health goals.    current treatment plan is effective, no change in therapy, orders and follow up as documented in EMR, lab results reviewed with patient, repeat labs ordered prior to next appointment, reviewed compliance with lifestyle measures, reviewed diet, exercise and weight control, reviewed medications and side effects in detail         I have reviewed OARRS report, consistent with prescribed medication. I have  considered risks of abuse, diversion, side effects and feel clinically benefit of medication outweighs risks at this time.

## 2025-05-11 NOTE — ASSESSMENT & PLAN NOTE
-Addendum 2/18/2025.  -Will obtain home sleep study test based on STOP-BANG score of 5, which indicates high risk for moderate to severe sleep apnea.  Patient is a male patient being treate for uncontrolled hypertension, has daytime fatigue, snoring, and obesity with a BMI of 40+.  -Will order home sleep study to evaluate for sleep apnea.

## 2025-05-11 NOTE — ASSESSMENT & PLAN NOTE
-BP Goal <130/80.  -Started chlorthalidone 50 mg on 2/10/2025.  -Started Lisinopril 5mg on 5/12/2025.   -Patient and I discussed dietary and lifestyle modifications, including DASH diet.  -Will obtain home sleep study test based on STOP-BANG score of 3.

## 2025-05-12 ENCOUNTER — APPOINTMENT (OUTPATIENT)
Dept: PRIMARY CARE | Facility: CLINIC | Age: 40
End: 2025-05-12
Payer: COMMERCIAL

## 2025-05-12 VITALS
TEMPERATURE: 98.4 F | OXYGEN SATURATION: 96 % | WEIGHT: 235.8 LBS | HEIGHT: 73 IN | SYSTOLIC BLOOD PRESSURE: 141 MMHG | BODY MASS INDEX: 31.25 KG/M2 | DIASTOLIC BLOOD PRESSURE: 93 MMHG | HEART RATE: 89 BPM

## 2025-05-12 DIAGNOSIS — I10 UNCONTROLLED HYPERTENSION: Primary | ICD-10-CM

## 2025-05-12 DIAGNOSIS — E78.5 HYPERLIPIDEMIA, UNSPECIFIED HYPERLIPIDEMIA TYPE: ICD-10-CM

## 2025-05-12 DIAGNOSIS — F17.200 TOBACCO DEPENDENCY: ICD-10-CM

## 2025-05-12 PROCEDURE — 3077F SYST BP >= 140 MM HG: CPT | Performed by: STUDENT IN AN ORGANIZED HEALTH CARE EDUCATION/TRAINING PROGRAM

## 2025-05-12 PROCEDURE — 99214 OFFICE O/P EST MOD 30 MIN: CPT | Performed by: STUDENT IN AN ORGANIZED HEALTH CARE EDUCATION/TRAINING PROGRAM

## 2025-05-12 PROCEDURE — 3080F DIAST BP >= 90 MM HG: CPT | Performed by: STUDENT IN AN ORGANIZED HEALTH CARE EDUCATION/TRAINING PROGRAM

## 2025-05-12 PROCEDURE — 3008F BODY MASS INDEX DOCD: CPT | Performed by: STUDENT IN AN ORGANIZED HEALTH CARE EDUCATION/TRAINING PROGRAM

## 2025-05-12 RX ORDER — LISINOPRIL 5 MG/1
5 TABLET ORAL DAILY
Qty: 90 TABLET | Refills: 3 | Status: SHIPPED | OUTPATIENT
Start: 2025-05-12

## 2025-05-12 ASSESSMENT — PATIENT HEALTH QUESTIONNAIRE - PHQ9
2. FEELING DOWN, DEPRESSED OR HOPELESS: NOT AT ALL
1. LITTLE INTEREST OR PLEASURE IN DOING THINGS: NOT AT ALL
SUM OF ALL RESPONSES TO PHQ9 QUESTIONS 1 AND 2: 0

## 2025-05-12 ASSESSMENT — ENCOUNTER SYMPTOMS
OCCASIONAL FEELINGS OF UNSTEADINESS: 0
DEPRESSION: 0
LOSS OF SENSATION IN FEET: 0

## 2025-06-03 LAB
CHOLEST SERPL-MCNC: 224 MG/DL
CHOLEST/HDLC SERPL: 4.9 (CALC)
HDLC SERPL-MCNC: 46 MG/DL
LDLC SERPL CALC-MCNC: 142 MG/DL (CALC)
NONHDLC SERPL-MCNC: 178 MG/DL (CALC)
TRIGL SERPL-MCNC: 221 MG/DL

## 2025-09-08 ENCOUNTER — APPOINTMENT (OUTPATIENT)
Dept: PRIMARY CARE | Facility: CLINIC | Age: 40
End: 2025-09-08
Payer: COMMERCIAL

## 2025-09-29 ENCOUNTER — APPOINTMENT (OUTPATIENT)
Dept: PRIMARY CARE | Facility: CLINIC | Age: 40
End: 2025-09-29
Payer: COMMERCIAL